# Patient Record
Sex: FEMALE | Race: WHITE | NOT HISPANIC OR LATINO | Employment: OTHER | URBAN - METROPOLITAN AREA
[De-identification: names, ages, dates, MRNs, and addresses within clinical notes are randomized per-mention and may not be internally consistent; named-entity substitution may affect disease eponyms.]

---

## 2024-07-29 DIAGNOSIS — Z00.00 ROUTINE HEALTH MAINTENANCE: ICD-10-CM

## 2024-07-29 DIAGNOSIS — Z76.89 ENCOUNTER TO ESTABLISH CARE WITH NEW DOCTOR: Primary | ICD-10-CM

## 2024-08-01 ENCOUNTER — OFFICE VISIT (OUTPATIENT)
Dept: CARDIOLOGY | Facility: CLINIC | Age: 74
End: 2024-08-01
Payer: MEDICARE

## 2024-08-01 ENCOUNTER — HOSPITAL ENCOUNTER (OUTPATIENT)
Dept: CARDIOLOGY | Facility: HOSPITAL | Age: 74
Discharge: HOME | End: 2024-08-01
Attending: INTERNAL MEDICINE
Payer: MEDICARE

## 2024-08-01 VITALS
BODY MASS INDEX: 32.62 KG/M2 | WEIGHT: 220.25 LBS | HEIGHT: 69 IN | DIASTOLIC BLOOD PRESSURE: 80 MMHG | SYSTOLIC BLOOD PRESSURE: 170 MMHG | OXYGEN SATURATION: 98 %

## 2024-08-01 DIAGNOSIS — M48.062 SPINAL STENOSIS OF LUMBAR REGION WITH NEUROGENIC CLAUDICATION: ICD-10-CM

## 2024-08-01 DIAGNOSIS — I25.10 CORONARY ARTERY CALCIFICATION: Primary | ICD-10-CM

## 2024-08-01 DIAGNOSIS — I65.23 BILATERAL CAROTID ARTERY STENOSIS: ICD-10-CM

## 2024-08-01 DIAGNOSIS — Z98.890 H/O CAROTID ENDARTERECTOMY: ICD-10-CM

## 2024-08-01 DIAGNOSIS — R94.31 ABNORMAL EKG: ICD-10-CM

## 2024-08-01 DIAGNOSIS — Z76.89 ENCOUNTER TO ESTABLISH CARE WITH NEW DOCTOR: ICD-10-CM

## 2024-08-01 DIAGNOSIS — Z72.0 TOBACCO USE: ICD-10-CM

## 2024-08-01 DIAGNOSIS — Z00.00 ROUTINE HEALTH MAINTENANCE: ICD-10-CM

## 2024-08-01 DIAGNOSIS — I10 PRIMARY HYPERTENSION: ICD-10-CM

## 2024-08-01 DIAGNOSIS — E78.49 OTHER HYPERLIPIDEMIA: ICD-10-CM

## 2024-08-01 DIAGNOSIS — I25.84 CORONARY ARTERY CALCIFICATION: Primary | ICD-10-CM

## 2024-08-01 DIAGNOSIS — I73.9 PERIPHERAL VASCULAR DISEASE OF LOWER EXTREMITY: ICD-10-CM

## 2024-08-01 DIAGNOSIS — I73.9 PVD (PERIPHERAL VASCULAR DISEASE): ICD-10-CM

## 2024-08-01 PROCEDURE — 99999 PR PBB SHADOW E&M-EST. PATIENT-LVL III: CPT | Mod: PBBFAC,,, | Performed by: INTERNAL MEDICINE

## 2024-08-01 PROCEDURE — 99213 OFFICE O/P EST LOW 20 MIN: CPT | Mod: PBBFAC | Performed by: INTERNAL MEDICINE

## 2024-08-01 RX ORDER — NAPROXEN SODIUM 220 MG/1
1 TABLET, FILM COATED ORAL EVERY MORNING
COMMUNITY

## 2024-08-01 RX ORDER — IRBESARTAN 75 MG/1
300 TABLET ORAL
COMMUNITY

## 2024-08-01 RX ORDER — ASCORBIC ACID 500 MG
500 TABLET ORAL
COMMUNITY

## 2024-08-01 RX ORDER — ALPRAZOLAM 0.5 MG/1
TABLET ORAL
COMMUNITY
Start: 2024-06-20

## 2024-08-01 RX ORDER — IBUPROFEN 200 MG
TABLET ORAL
COMMUNITY

## 2024-08-01 RX ORDER — IBUPROFEN 600 MG/1
600 TABLET ORAL 3 TIMES DAILY
COMMUNITY

## 2024-08-01 RX ORDER — AZITHROMYCIN 250 MG/1
TABLET, FILM COATED ORAL
COMMUNITY

## 2024-08-01 RX ORDER — BEMPEDOIC ACID AND EZETIMIBE 180; 10 MG/1; MG/1
1 TABLET, FILM COATED ORAL EVERY MORNING
Qty: 90 TABLET | Refills: 3 | Status: SHIPPED | OUTPATIENT
Start: 2024-08-01

## 2024-08-01 RX ORDER — FLUTICASONE PROPIONATE 50 MCG
1 SPRAY, SUSPENSION (ML) NASAL
COMMUNITY
Start: 2024-02-29 | End: 2024-08-27

## 2024-08-01 RX ORDER — BEMPEDOIC ACID AND EZETIMIBE 180; 10 MG/1; MG/1
1 TABLET, FILM COATED ORAL EVERY MORNING
COMMUNITY
Start: 2024-02-29 | End: 2024-08-01 | Stop reason: SDUPTHER

## 2024-08-01 RX ORDER — ACYCLOVIR 400 MG/1
400 TABLET ORAL
COMMUNITY
Start: 2024-07-31 | End: 2024-08-05

## 2024-08-01 RX ORDER — CYANOCOBALAMIN 1000 UG/ML
1000 INJECTION, SOLUTION INTRAMUSCULAR; SUBCUTANEOUS
COMMUNITY
Start: 2024-07-27

## 2024-08-01 RX ORDER — HYDROCHLOROTHIAZIDE 12.5 MG/1
12.5 TABLET ORAL DAILY
Qty: 30 TABLET | Refills: 11 | Status: SHIPPED | OUTPATIENT
Start: 2024-08-01 | End: 2025-08-01

## 2024-08-01 RX ORDER — CALCITRIOL 0.5 UG/1
0.5 CAPSULE ORAL
COMMUNITY

## 2024-08-01 RX ORDER — FLUTICASONE FUROATE, UMECLIDINIUM BROMIDE AND VILANTEROL TRIFENATATE 200; 62.5; 25 UG/1; UG/1; UG/1
1 POWDER RESPIRATORY (INHALATION)
COMMUNITY
Start: 2024-07-31

## 2024-08-01 RX ORDER — ESTRADIOL AND NORETHINDRONE ACETATE 1; .5 MG/1; MG/1
1 TABLET ORAL
COMMUNITY
Start: 2024-07-31

## 2024-08-01 NOTE — PROGRESS NOTES
Subjective:   Patient ID:  Jasmyne Rodriguez is a 73 y.o. female who presents for evaluation of Follow-up (ER follow up.)      HPI  New patient   74 yo female  had sudden onset 10 days of svere pain in upper back after walking in kitchen and having coffee.tghi ely cut her breathing she could not get a deep breath . She got a hot shower and has helped the pain . She is extremely weak.  Went to walk in clinic had cxr no significant pathology [place] ct [facility]  [facility] [place] that [place] . She subsequently left er  and went home and by the end of the day shane=t betetr and has other abnormalities. She has intolerance to statins was given repatha she had pain from repatha. She went to see dr FLYNN SHE WAS PLACED ON VASCEPA THAT SHE COULD NOT TOLERATE.   NOT TRIED ZETIA BEFORE. MED LIST SHOWED NEXLIZET THAT SHE IS NOT SURE WHO PRESCRIBED IT. SHE IS NOT SURE WHAT HAPPENED WITH PLAVIX.  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   Has exertional shortness of breath  has foot pain and iknee pain. She used to use the treadmill . Not taking diuretics.  History reviewed. No pertinent surgical history.    Social History     Tobacco Use    Smoking status: Some Days     Types: Cigarettes    Smokeless tobacco: Current       No family history on file.    Current Outpatient Medications   Medication Sig    acyclovir (ZOVIRAX) 400 MG tablet Take 400 mg by mouth.    ALPRAZolam (XANAX) 0.5 MG tablet TAKE 1 TABLET BY MOUTH 2 TIMES DAILY AS NEEDED FOR SLEEP.    ascorbic acid, vitamin C, (VITAMIN C) 500 MG tablet Take 500 mg by mouth.    aspirin 81 MG Chew Take 1 tablet by mouth every morning.    azithromycin (Z-ANTOINE) 250 MG tablet Oral for 5 Days    calcitRIOL (ROCALTROL) 0.5 MCG Cap Take 0.5 mcg by mouth.    cyanocobalamin 1,000 mcg/mL injection Inject 1,000 mcg into the muscle.    estradiol-norethindrone (ACTIVELLA) 1-0.5 mg per tablet Take 1 tablet by mouth.    fluticasone propionate (FLONASE) 50 mcg/actuation nasal spray 1  spray by Nasal route.    fluticasone-umeclidin-vilanter (TRELEGY ELLIPTA) 200-62.5-25 mcg inhaler Inhale 1 puff into the lungs.    ibuprofen (ADVIL) 200 MG tablet Advil    ibuprofen (ADVIL,MOTRIN) 600 MG tablet Take 600 mg by mouth 3 (three) times daily.    irbesartan (AVAPRO) 75 MG tablet 300 mg.    bempedoic acid-ezetimibe (NEXLIZET) 180-10 mg Tab Take 1 tablet by mouth every morning.    hydroCHLOROthiazide (HYDRODIURIL) 12.5 MG Tab Take 1 tablet (12.5 mg total) by mouth once daily.     No current facility-administered medications for this visit.     Current Outpatient Medications on File Prior to Visit   Medication Sig    acyclovir (ZOVIRAX) 400 MG tablet Take 400 mg by mouth.    ALPRAZolam (XANAX) 0.5 MG tablet TAKE 1 TABLET BY MOUTH 2 TIMES DAILY AS NEEDED FOR SLEEP.    ascorbic acid, vitamin C, (VITAMIN C) 500 MG tablet Take 500 mg by mouth.    aspirin 81 MG Chew Take 1 tablet by mouth every morning.    azithromycin (Z-ANTOINE) 250 MG tablet Oral for 5 Days    calcitRIOL (ROCALTROL) 0.5 MCG Cap Take 0.5 mcg by mouth.    cyanocobalamin 1,000 mcg/mL injection Inject 1,000 mcg into the muscle.    estradiol-norethindrone (ACTIVELLA) 1-0.5 mg per tablet Take 1 tablet by mouth.    fluticasone propionate (FLONASE) 50 mcg/actuation nasal spray 1 spray by Nasal route.    fluticasone-umeclidin-vilanter (TRELEGY ELLIPTA) 200-62.5-25 mcg inhaler Inhale 1 puff into the lungs.    ibuprofen (ADVIL) 200 MG tablet Advil    ibuprofen (ADVIL,MOTRIN) 600 MG tablet Take 600 mg by mouth 3 (three) times daily.    irbesartan (AVAPRO) 75 MG tablet 300 mg.    [DISCONTINUED] bempedoic acid-ezetimibe (NEXLIZET) 180-10 mg Tab Take 1 tablet by mouth every morning.     No current facility-administered medications on file prior to visit.       Review of patient's allergies indicates:   Allergen Reactions    Fentanyl Other (See Comments)     Patient states that she waken up during surgery, spikes blood pressure, and cold temp    Latex, natural  rubber Rash and Other (See Comments)    Penicillins Anaphylaxis and Other (See Comments)    Clopidogrel      myalgia    Evolocumab      Back pain    Gloves, latex with aloe vera Other (See Comments)    Statins-hmg-coa reductase inhibitors      myopathy    Clindamycin Hives, Other (See Comments) and Palpitations     Stripped intestinal track( blisters all over)       Review of Systems   Constitutional: Negative for diaphoresis, malaise/fatigue and weight gain.   HENT:  Negative for hoarse voice.    Eyes:  Negative for double vision and visual disturbance.   Cardiovascular:  Negative for chest pain, claudication, cyanosis, dyspnea on exertion, irregular heartbeat, leg swelling, near-syncope, orthopnea, palpitations, paroxysmal nocturnal dyspnea and syncope.   Respiratory:  Negative for cough, hemoptysis, shortness of breath and snoring.    Hematologic/Lymphatic: Negative for bleeding problem. Does not bruise/bleed easily.   Skin:  Negative for color change and poor wound healing.   Musculoskeletal:  Negative for muscle cramps, muscle weakness and myalgias.   Gastrointestinal:  Negative for bloating, abdominal pain, change in bowel habit, diarrhea, heartburn, hematemesis, hematochezia, melena and nausea.   Neurological:  Negative for excessive daytime sleepiness, dizziness, headaches, light-headedness, loss of balance, numbness and weakness.   Psychiatric/Behavioral:  Negative for memory loss. The patient does not have insomnia.    Allergic/Immunologic: Negative for hives.       Objective:   Physical Exam  Vitals and nursing note reviewed.   Constitutional:       General: She is not in acute distress.     Appearance: Normal appearance. She is well-developed. She is not ill-appearing.   HENT:      Head: Normocephalic and atraumatic.   Eyes:      General: No scleral icterus.     Pupils: Pupils are equal, round, and reactive to light.   Neck:      Thyroid: No thyromegaly.      Vascular: Normal carotid pulses. Carotid  "bruit present. No hepatojugular reflux or JVD.      Trachea: No tracheal deviation.      Comments: Scar cea well healed.   Cardiovascular:      Rate and Rhythm: Normal rate and regular rhythm.      Pulses: Normal pulses.      Heart sounds: Normal heart sounds. No murmur heard.     No friction rub. No gallop.   Pulmonary:      Effort: Pulmonary effort is normal. No respiratory distress.      Breath sounds: Normal breath sounds. No wheezing, rhonchi or rales.   Chest:      Chest wall: No tenderness.   Abdominal:      General: Bowel sounds are normal. There is no abdominal bruit.      Palpations: Abdomen is soft. There is no hepatomegaly or pulsatile mass.      Tenderness: There is no abdominal tenderness.   Musculoskeletal:      Right shoulder: No deformity.      Cervical back: Normal range of motion and neck supple.      Right lower leg: No edema.      Left lower leg: No edema.   Skin:     General: Skin is warm and dry.      Findings: No erythema or rash.      Nails: There is no clubbing.   Neurological:      General: No focal deficit present.      Mental Status: She is alert and oriented to person, place, and time.      Cranial Nerves: No cranial nerve deficit.      Coordination: Coordination normal.   Psychiatric:         Mood and Affect: Mood normal.         Speech: Speech normal.         Behavior: Behavior normal.       Vitals:    08/01/24 1425 08/01/24 1436   BP: (!) 160/80 (!) 170/80   BP Location: Left arm Left arm   Patient Position: Sitting Sitting   BP Method: Large (Manual) Large (Manual)   SpO2: 98%    Weight: 99.9 kg (220 lb 3.8 oz)    Height: 5' 9" (1.753 m)      No results found for: "CHOL"  Body mass index is 32.52 kg/m².   No results found for: "LABA1C", "HGBA1C"   BMP  No results found for: "NA", "K", "CL", "CO2", "BUN", "CREATININE", "CALCIUM", "ANIONGAP", "EGFRNORACEVR"   No results found for: "HDL"  No results found for: "LDLCALC"  No results found for: "TRIG"  No results found for: "CHOLHDL"    " "Chemistry    No results found for: "NA", "K", "CL", "CO2", "BUN", "CREATININE", "GLU" No results found for: "CALCIUM", "ALKPHOS", "AST", "ALT", "BILITOT", "ESTGFRAFRICA", "EGFRNONAA"       Lab Results   Component Value Date    TSH 1.47 08/17/2023     No results found for: "INR", "PROTIME"  No results found for: "WBC", "HGB", "HCT", "MCV", "PLT"  BNP  @LABRCNTIP(BNP,BNPTRIAGEBLO)@  CrCl cannot be calculated (No successful lab value found.).  Assessment:     1. Coronary artery calcification    2. Peripheral vascular disease of lower extremity    3. Bilateral carotid artery stenosis    4. Primary hypertension    5. Other hyperlipidemia    6. PVD (peripheral vascular disease)    7. Spinal stenosis of lumbar region with neurogenic claudication    8. Tobacco use    9. H/O carotid endarterectomy    10. Abnormal EKG    Htn not well controlled will add diuretics. Low salt diet emphasized.   Carotid stenosis s/p cea follows with vascular surgery Dr Salas CONTINUE ASA   HLP INTOLERANT TO STATINS REPATHA WILL TRY NEXLIZET  CORONARY CALCIFICATION WITH EXERTIONAL SHORTNESS OF BREATH AND ABNORMAL EKG WILL GET ECHO CARDIOLITE    PVD ASYMPTOMATIC S/P SFA INTERVENTION CONTINUE ASA EC 81 MG PO DAILY   SMOKING CESSATION COUNSELING PERFORMED.   SPINAL STENOSIS SUGGEST WATER EXERCISE SWIMMING FOR EXERCISE.     Plan:   ASA    NEXLIZET  ECHO    CARDIOLITE   BP CHART    F/U IN 1 MONTH.  SMOKING CESSATION    "

## 2024-08-06 ENCOUNTER — HOSPITAL ENCOUNTER (OUTPATIENT)
Dept: CARDIOLOGY | Facility: HOSPITAL | Age: 74
Discharge: HOME | End: 2024-08-06
Attending: INTERNAL MEDICINE
Payer: MEDICARE

## 2024-08-06 VITALS
SYSTOLIC BLOOD PRESSURE: 170 MMHG | WEIGHT: 220 LBS | DIASTOLIC BLOOD PRESSURE: 80 MMHG | HEIGHT: 69 IN | BODY MASS INDEX: 32.58 KG/M2

## 2024-08-06 DIAGNOSIS — Z72.0 TOBACCO USE: ICD-10-CM

## 2024-08-06 DIAGNOSIS — I25.10 CORONARY ARTERY CALCIFICATION: ICD-10-CM

## 2024-08-06 DIAGNOSIS — I10 PRIMARY HYPERTENSION: ICD-10-CM

## 2024-08-06 DIAGNOSIS — I25.84 CORONARY ARTERY CALCIFICATION: ICD-10-CM

## 2024-08-06 DIAGNOSIS — R94.31 ABNORMAL EKG: ICD-10-CM

## 2024-08-06 LAB
AORTIC ROOT ANNULUS: 3.4 CM
ASCENDING AORTA: 3.91 CM
AV INDEX (PROSTH): 0.89
AV MEAN GRADIENT: 4 MMHG
AV PEAK GRADIENT: 7 MMHG
AV VALVE AREA BY VELOCITY RATIO: 2.59 CM²
AV VALVE AREA: 2.65 CM²
AV VELOCITY RATIO: 0.87
BSA FOR ECHO PROCEDURE: 2.2 M2
CV ECHO LV RWT: 0.55 CM
DOP CALC AO PEAK VEL: 1.35 M/S
DOP CALC AO VTI: 32.9 CM
DOP CALC LVOT AREA: 3 CM2
DOP CALC LVOT DIAMETER: 1.95 CM
DOP CALC LVOT PEAK VEL: 1.17 M/S
DOP CALC LVOT STROKE VOLUME: 87.16 CM3
DOP CALC RVOT PEAK VEL: 1.17 M/S
DOP CALC RVOT VTI: 28.8 CM
DOP CALCLVOT PEAK VEL VTI: 29.2 CM
E WAVE DECELERATION TIME: 336.88 MSEC
E/A RATIO: 0.86
E/E' RATIO: 15.2 M/S
ECHO LV POSTERIOR WALL: 1.15 CM (ref 0.6–1.1)
EJECTION FRACTION: 60 %
FRACTIONAL SHORTENING: 35 % (ref 28–44)
INTERVENTRICULAR SEPTUM: 1.4 CM (ref 0.6–1.1)
IVC DIAMETER: 2 CM
IVRT: 131.3 MSEC
LA MAJOR: 4.8 CM
LA MINOR: 5.5 CM
LA WIDTH: 4.2 CM
LEFT ATRIUM AREA SYSTOLIC (APICAL 2 CHAMBER): 18.36 CM2
LEFT ATRIUM AREA SYSTOLIC (APICAL 4 CHAMBER): 14.66 CM2
LEFT ATRIUM SIZE: 3.08 CM
LEFT ATRIUM VOLUME INDEX MOD: 24.1 ML/M2
LEFT ATRIUM VOLUME INDEX: 26.2 ML/M2
LEFT ATRIUM VOLUME MOD: 51.8 CM3
LEFT ATRIUM VOLUME: 56.37 CM3
LEFT INTERNAL DIMENSION IN SYSTOLE: 2.74 CM (ref 2.1–4)
LEFT VENTRICLE DIASTOLIC VOLUME INDEX: 36.88 ML/M2
LEFT VENTRICLE DIASTOLIC VOLUME: 79.29 ML
LEFT VENTRICLE END SYSTOLIC VOLUME APICAL 2 CHAMBER: 60.87 ML
LEFT VENTRICLE END SYSTOLIC VOLUME APICAL 4 CHAMBER: 40.8 ML
LEFT VENTRICLE MASS INDEX: 91 G/M2
LEFT VENTRICLE SYSTOLIC VOLUME INDEX: 13 ML/M2
LEFT VENTRICLE SYSTOLIC VOLUME: 28.05 ML
LEFT VENTRICULAR INTERNAL DIMENSION IN DIASTOLE: 4.22 CM (ref 3.5–6)
LEFT VENTRICULAR MASS: 196.23 G
LV LATERAL E/E' RATIO: 12.67 M/S
LV SEPTAL E/E' RATIO: 19 M/S
LVED V (TEICH): 79.29 ML
LVES V (TEICH): 28.05 ML
LVOT MG: 2.73 MMHG
LVOT MV: 0.76 CM/S
MV PEAK A VEL: 1.33 M/S
MV PEAK E VEL: 1.14 M/S
MV STENOSIS PRESSURE HALF TIME: 97.7 MS
MV VALVE AREA P 1/2 METHOD: 2.25 CM2
OHS CV RV/LV RATIO: 0.85 CM
PV MEAN GRADIENT: 3 MMHG
PV MV: 0.83 M/S
PV PEAK GRADIENT: 5 MMHG
PV PEAK VELOCITY: 1.12 M/S
RA MAJOR: 4.47 CM
RA PRESSURE ESTIMATED: 3 MMHG
RA WIDTH: 4.14 CM
RIGHT VENTRICULAR END-DIASTOLIC DIMENSION: 3.59 CM
SINUS: 3.69 CM
STJ: 3.69 CM
TDI LATERAL: 0.09 M/S
TDI SEPTAL: 0.06 M/S
TDI: 0.08 M/S
TRICUSPID ANNULAR PLANE SYSTOLIC EXCURSION: 2.08 CM
Z-SCORE OF LEFT VENTRICULAR DIMENSION IN END DIASTOLE: -5.03
Z-SCORE OF LEFT VENTRICULAR DIMENSION IN END SYSTOLE: -3.47

## 2024-08-06 PROCEDURE — 93306 TTE W/DOPPLER COMPLETE: CPT | Mod: 26,,, | Performed by: INTERNAL MEDICINE

## 2024-08-06 PROCEDURE — 93306 TTE W/DOPPLER COMPLETE: CPT

## 2024-08-07 ENCOUNTER — TELEPHONE (OUTPATIENT)
Dept: CARDIOLOGY | Facility: CLINIC | Age: 74
End: 2024-08-07
Payer: MEDICARE

## 2024-08-20 ENCOUNTER — TELEPHONE (OUTPATIENT)
Dept: CARDIOLOGY | Facility: HOSPITAL | Age: 74
End: 2024-08-20
Payer: MEDICARE

## 2024-08-20 NOTE — TELEPHONE ENCOUNTER
Patient called and decided to cancel Nuclear Stress Test scheduled for 09/10/2024 at [facility], stating she will return to previous Cardiologist  who had ordered Nuc before seeing Dr. Blackmon, also with less waiting time between each segment of the test.

## 2024-08-27 ENCOUNTER — TELEPHONE (OUTPATIENT)
Dept: CARDIOLOGY | Facility: CLINIC | Age: 74
End: 2024-08-27
Payer: MEDICARE

## 2024-08-27 NOTE — TELEPHONE ENCOUNTER
Pt states she is getting Dr Goodrich's office to fax over stress test results that were abnormal. She would like you to call her once you receive them to have Dr Blackmon review to see if her appt needs moved up sooner.   She is understanding that she may need a heart cath based on these result and pertaining to her left ventricle.      Thank you.       ----- Message from Izabella Washington sent at 8/27/2024  3:26 PM CDT -----  Contact: Jasmyne  .Type:  Patient Returning Call    Who Called: Jasmyne   Who Left Message for Patient: Nicolasa  Does the patient know what this is regarding?: Testing questions/ concerns   Would the patient rather a call back or a response via [facility]?  Call   Best Call Back Number: [phone]  Additional Information:

## 2024-08-27 NOTE — TELEPHONE ENCOUNTER
Called [phone] and went to voice mail.  TCB to discuss testing.         ----- Message from Tiny Mendoza sent at 8/27/2024 10:40 AM CDT -----  Contact: self   .Type: Patient Call Back        Who called:   Patient      What is the request in detail:    Patient called in concerning abnormal stress test . Patient would like to move appt up to a sooner date because the test shown that it is a left ventricle appt   Can the clinic reply by [facility]?           Would the patient rather a call back or a response via [facility]?   call      Best call back number:  [phone]

## 2024-08-30 NOTE — TELEPHONE ENCOUNTER
Patient sent a form via my chart.  Test results from [facility].  Did you want her to return to [facility] or did you want her to return to Dr Kruger?

## 2024-09-05 ENCOUNTER — TELEPHONE (OUTPATIENT)
Dept: CARDIOLOGY | Facility: CLINIC | Age: 74
End: 2024-09-05
Payer: MEDICARE

## 2024-09-05 NOTE — TELEPHONE ENCOUNTER
Patient dropped off a letter for the provider to review.     I have scanned the copy into media.

## 2024-09-12 ENCOUNTER — TELEPHONE (OUTPATIENT)
Dept: CARDIOLOGY | Facility: CLINIC | Age: 74
End: 2024-09-12
Payer: MEDICARE

## 2024-09-12 NOTE — TELEPHONE ENCOUNTER
L ventricle issues been waiting 3 weeks- appt tomorrow      ----- Message from Tiny Mendoza sent at 9/12/2024  8:44 AM CDT -----  Contact: SELF   .Type: Patient Call Back        Who called:   pATIENT      What is the request in detail:    CALLED IN CONCERNING APPT THAT WAS CANCELLED DUE TO WEATHER. PATIENT WOULD LIKE TO BE SEEN TOMORROW IF POSSIBLE . PLEASE CALL BACK   Can the clinic reply by [facility]?           Would the patient rather a call back or a response via [facility]?   CALL      Best call back number:  [phone]

## 2024-09-13 ENCOUNTER — OFFICE VISIT (OUTPATIENT)
Dept: CARDIOLOGY | Facility: CLINIC | Age: 74
End: 2024-09-13
Payer: MEDICARE

## 2024-09-13 VITALS
DIASTOLIC BLOOD PRESSURE: 82 MMHG | OXYGEN SATURATION: 97 % | BODY MASS INDEX: 32.39 KG/M2 | HEART RATE: 80 BPM | WEIGHT: 218.69 LBS | HEIGHT: 69 IN | SYSTOLIC BLOOD PRESSURE: 160 MMHG

## 2024-09-13 DIAGNOSIS — I25.84 CORONARY ARTERY CALCIFICATION: ICD-10-CM

## 2024-09-13 DIAGNOSIS — Z72.0 TOBACCO USE: ICD-10-CM

## 2024-09-13 DIAGNOSIS — I73.9 PVD (PERIPHERAL VASCULAR DISEASE): ICD-10-CM

## 2024-09-13 DIAGNOSIS — I10 PRIMARY HYPERTENSION: ICD-10-CM

## 2024-09-13 DIAGNOSIS — E78.49 OTHER HYPERLIPIDEMIA: ICD-10-CM

## 2024-09-13 DIAGNOSIS — R94.39 ABNORMAL NUCLEAR STRESS TEST: Primary | ICD-10-CM

## 2024-09-13 DIAGNOSIS — M48.062 SPINAL STENOSIS OF LUMBAR REGION WITH NEUROGENIC CLAUDICATION: ICD-10-CM

## 2024-09-13 DIAGNOSIS — I25.10 CORONARY ARTERY CALCIFICATION: ICD-10-CM

## 2024-09-13 DIAGNOSIS — Z98.890 H/O CAROTID ENDARTERECTOMY: ICD-10-CM

## 2024-09-13 DIAGNOSIS — R94.31 ABNORMAL EKG: ICD-10-CM

## 2024-09-13 DIAGNOSIS — I65.23 BILATERAL CAROTID ARTERY STENOSIS: ICD-10-CM

## 2024-09-13 PROCEDURE — 99999 PR PBB SHADOW E&M-EST. PATIENT-LVL IV: CPT | Mod: PBBFAC,,, | Performed by: INTERNAL MEDICINE

## 2024-09-13 PROCEDURE — 99214 OFFICE O/P EST MOD 30 MIN: CPT | Mod: PBBFAC | Performed by: INTERNAL MEDICINE

## 2024-09-13 RX ORDER — AMLODIPINE BESYLATE 2.5 MG/1
2.5 TABLET ORAL DAILY
Qty: 90 TABLET | Refills: 3 | Status: SHIPPED | OUTPATIENT
Start: 2024-09-13 | End: 2025-09-13

## 2024-09-13 NOTE — PROGRESS NOTES
Subjective:   Patient ID:  Jasmyne Rodriguez is a 73 y.o. female who presents for follow up of No chief complaint on file.      HPI  New patient   72 yo female  had sudden onset 10 days of svere pain in upper back after walking in kitchen and having coffee.this pain cut her breathing she could not get a deep breath . She got a hot shower and has helped the pain . She is extremely weak.  Went to walk in clinic had cxr no significant pathology [place] ct [facility]  [facility] [place] . She subsequently left er  and went home and by the end of the day felt better and has other abnormalities. She has intolerance to statins was given repatha she had pain from repatha. She went to see dr FLYNN SHE WAS PLACED ON VASCEPA THAT SHE COULD NOT TOLERATE.   NOT TRIED ZETIA BEFORE. MED LIST SHOWED NEXLIZET THAT SHE IS NOT SURE WHO PRESCRIBED IT. SHE IS NOT SURE WHAT HAPPENED WITH PLAVIX.  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   Has exertional shortness of breath  has foot pain and iknee pain. She used to use the treadmill . Not taking diuretics.  History reviewed. No pertinent surgical history.    9/13/2024   Not sure that her dizziness she had was from nexlizet. She stopped it. She will try it again. Has no new symptoms. She is taking diuretics. She is under stress her friend passed away . I have reviewed her bp readings from home they are elevated. She is compliant with her diet by her report. Her shortness of breath is unchanged      Past Medical History:   Diagnosis Date    Bilateral carotid artery stenosis 08/01/2024    Other hyperlipidemia 08/01/2024    Primary hypertension 08/01/2024    Spinal stenosis of lumbar region with neurogenic claudication 08/01/2024       History reviewed. No pertinent surgical history.    Social History     Tobacco Use    Smoking status: Some Days     Types: Cigarettes    Smokeless tobacco: Current       No family history on file.    Current Outpatient Medications   Medication Sig     ALPRAZolam (XANAX) 0.5 MG tablet TAKE 1 TABLET BY MOUTH 2 TIMES DAILY AS NEEDED FOR SLEEP.    ascorbic acid, vitamin C, (VITAMIN C) 500 MG tablet Take 500 mg by mouth.    aspirin 81 MG Chew Take 1 tablet by mouth every morning.    bempedoic acid-ezetimibe (NEXLIZET) 180-10 mg Tab Take 1 tablet by mouth every morning. (Patient not taking: Reported on 9/13/2024)    calcitRIOL (ROCALTROL) 0.5 MCG Cap Take 0.5 mcg by mouth. (Patient not taking: Reported on 9/13/2024)    cyanocobalamin 1,000 mcg/mL injection Inject 1,000 mcg into the muscle. (Patient not taking: Reported on 9/13/2024)    estradiol-norethindrone (ACTIVELLA) 1-0.5 mg per tablet Take 1 tablet by mouth.    fluticasone-umeclidin-vilanter (TRELEGY ELLIPTA) 200-62.5-25 mcg inhaler Inhale 1 puff into the lungs. (Patient not taking: Reported on 9/13/2024)    hydroCHLOROthiazide (HYDRODIURIL) 12.5 MG Tab Take 1 tablet (12.5 mg total) by mouth once daily.    ibuprofen (ADVIL) 200 MG tablet Advil    ibuprofen (ADVIL,MOTRIN) 600 MG tablet Take 600 mg by mouth once daily.    irbesartan (AVAPRO) 75 MG tablet 300 mg.     No current facility-administered medications for this visit.     Current Outpatient Medications on File Prior to Visit   Medication Sig    ALPRAZolam (XANAX) 0.5 MG tablet TAKE 1 TABLET BY MOUTH 2 TIMES DAILY AS NEEDED FOR SLEEP.    ascorbic acid, vitamin C, (VITAMIN C) 500 MG tablet Take 500 mg by mouth.    aspirin 81 MG Chew Take 1 tablet by mouth every morning.    bempedoic acid-ezetimibe (NEXLIZET) 180-10 mg Tab Take 1 tablet by mouth every morning. (Patient not taking: Reported on 9/13/2024)    calcitRIOL (ROCALTROL) 0.5 MCG Cap Take 0.5 mcg by mouth. (Patient not taking: Reported on 9/13/2024)    cyanocobalamin 1,000 mcg/mL injection Inject 1,000 mcg into the muscle. (Patient not taking: Reported on 9/13/2024)    estradiol-norethindrone (ACTIVELLA) 1-0.5 mg per tablet Take 1 tablet by mouth.    fluticasone-umeclidin-vilanter (TRELEGY ELLIPTA)  200-62.5-25 mcg inhaler Inhale 1 puff into the lungs. (Patient not taking: Reported on 9/13/2024)    hydroCHLOROthiazide (HYDRODIURIL) 12.5 MG Tab Take 1 tablet (12.5 mg total) by mouth once daily.    ibuprofen (ADVIL) 200 MG tablet Advil    ibuprofen (ADVIL,MOTRIN) 600 MG tablet Take 600 mg by mouth once daily.    irbesartan (AVAPRO) 75 MG tablet 300 mg.    [DISCONTINUED] acyclovir (ZOVIRAX) 400 MG tablet Take 400 mg by mouth.    [DISCONTINUED] azithromycin (Z-ANTOINE) 250 MG tablet Oral for 5 Days     No current facility-administered medications on file prior to visit.     Review of patient's allergies indicates:   Allergen Reactions    Fentanyl Other (See Comments)     Patient states that she waken up during surgery, spikes blood pressure, and cold temp    Latex, natural rubber Rash and Other (See Comments)    Penicillins Anaphylaxis and Other (See Comments)    Clopidogrel      myalgia    Evolocumab      Back pain    Gloves, latex with aloe vera Other (See Comments)    Statins-hmg-coa reductase inhibitors      myopathy    Clindamycin Hives, Other (See Comments) and Palpitations     Stripped intestinal track( blisters all over)      Review of Systems   Constitutional: Negative for diaphoresis, malaise/fatigue and weight gain.   HENT:  Negative for hoarse voice.    Eyes:  Negative for double vision and visual disturbance.   Cardiovascular:  Positive for dyspnea on exertion. Negative for chest pain, claudication, cyanosis, irregular heartbeat, leg swelling, near-syncope, orthopnea, palpitations, paroxysmal nocturnal dyspnea and syncope.   Respiratory:  Positive for shortness of breath. Negative for cough, hemoptysis and snoring.    Hematologic/Lymphatic: Negative for bleeding problem. Does not bruise/bleed easily.   Skin:  Negative for color change and poor wound healing.   Musculoskeletal:  Negative for muscle cramps, muscle weakness and myalgias.   Gastrointestinal:  Negative for bloating, abdominal pain, change in  bowel habit, diarrhea, heartburn, hematemesis, hematochezia, melena and nausea.   Neurological:  Negative for excessive daytime sleepiness, dizziness, headaches, light-headedness, loss of balance, numbness and weakness.   Psychiatric/Behavioral:  Negative for memory loss. The patient does not have insomnia.    Allergic/Immunologic: Negative for hives.       Objective:   Physical Exam  Constitutional:       General: She is not in acute distress.     Appearance: Normal appearance. She is well-developed. She is not ill-appearing.   HENT:      Head: Normocephalic and atraumatic.   Eyes:      General: No scleral icterus.     Pupils: Pupils are equal, round, and reactive to light.   Neck:      Thyroid: No thyromegaly.      Vascular: Normal carotid pulses. Carotid bruit present. No hepatojugular reflux or JVD.      Trachea: No tracheal deviation.      Comments: Scar cea well healed.  Cardiovascular:      Rate and Rhythm: Normal rate and regular rhythm.      Pulses: Normal pulses.      Heart sounds: Normal heart sounds. No murmur heard.     No friction rub. No gallop.   Pulmonary:      Effort: Pulmonary effort is normal. No respiratory distress.      Breath sounds: Normal breath sounds. No wheezing, rhonchi or rales.   Chest:      Chest wall: No tenderness.   Abdominal:      General: Bowel sounds are normal. There is no abdominal bruit.      Palpations: Abdomen is soft. There is no hepatomegaly or pulsatile mass.      Tenderness: There is no abdominal tenderness.   Musculoskeletal:      Right shoulder: No deformity.      Cervical back: Normal range of motion and neck supple.   Skin:     General: Skin is warm and dry.      Findings: No erythema or rash.      Nails: There is no clubbing.   Neurological:      Mental Status: She is alert and oriented to person, place, and time.      Cranial Nerves: No cranial nerve deficit.      Coordination: Coordination normal.   Psychiatric:         Speech: Speech normal.         Behavior:  "Behavior normal.       Vitals:    09/13/24 0937 09/13/24 0941 09/13/24 0942   BP: (!) 160/82 (!) 154/77 (!) 160/82   BP Location: Right arm Right forearm Left arm   Patient Position: Sitting Sitting Sitting   BP Method: Large (Manual)  Large (Manual)   Pulse: 80     SpO2: 97%     Weight: 99.2 kg (218 lb 11.1 oz)     Height: 5' 9" (1.753 m)       No results found for: "CHOL"   Body mass index is 32.3 kg/m².   No results found for: "LABA1C", "HGBA1C"   BMP  No results found for: "NA", "K", "CL", "CO2", "BUN", "CREATININE", "CALCIUM", "ANIONGAP", "EGFRNORACEVR"   No results found for: "HDL"  No results found for: "LDLCALC"  No results found for: "TRIG"  No results found for: "CHOLHDL"    Chemistry    No results found for: "NA", "K", "CL", "CO2", "BUN", "CREATININE", "GLU" No results found for: "CALCIUM", "ALKPHOS", "AST", "ALT", "BILITOT", "ESTGFRAFRICA", "EGFRNONAA"       Lab Results   Component Value Date    TSH 1.47 08/17/2023     Summary  Show Result Comparison     Left Ventricle: The left ventricle is normal in size. Normal wall thickness. There is normal systolic function with a visually estimated ejection fraction of 60 - 65%. Ejection fraction by visual approximation is 60%. There is normal diastolic function.    Right Ventricle: Normal right ventricular cavity size. Wall thickness is normal. Systolic function is normal.    IVC/SVC: Normal venous pressure at 3 mmHg.     Assessment:     1. Abnormal nuclear stress test    2. PVD (peripheral vascular disease)    3. Bilateral carotid artery stenosis    4. Primary hypertension    5. Other hyperlipidemia    6. Spinal stenosis of lumbar region with neurogenic claudication    7. Coronary artery calcification    8. Abnormal EKG    9. Tobacco use    10. H/O carotid endarterectomy    [place] of cad ? Single vessel vs tip of iceberg.she has significant atherosclerosis with pvd carotid disease coronary " calcification smoking htn hlp . And has symptoms of shortness of breath she would benefit from coronary angiography in addition to smoking cessation taking her meds and place her on amlodpine 2.5 mg po daily.   Explained to her the rationale for heart cath risk benefits alternatives       Plan:   Amlodipine 2.5 mg po daily    Asa    Resume nexlizet.  Lhc/ptca  I have explained the risks, benefits , and alternatives of the procedure in detail.the patient voices understanding and all questions have been answered.the patient agrees to proceed as planned.

## 2024-09-16 ENCOUNTER — TELEPHONE (OUTPATIENT)
Dept: CARDIOLOGY | Facility: CLINIC | Age: 74
End: 2024-09-16
Payer: MEDICARE

## 2024-09-16 NOTE — TELEPHONE ENCOUNTER
Manuel for pt to call us back          ----- Message from Tiny Mendoza sent at 9/16/2024  1:43 PM CDT -----  Contact: self   .Type: Patient Call Back        Who called:   Patient      What is the request in detail:    Patient called in concerning being prescribed amLODIPine (NORVASC) 2.5 MG tablet before procedure . Patient states that she can't take this medication due to bloating and chest pain Patient wants to know if she needs to still take this medication to have the surgery . Please call back   Can the clinic reply by [facility]?           Would the patient rather a call back or a response via [facility]?   call      Best call back number:  [phone]

## 2024-09-17 ENCOUNTER — TELEPHONE (OUTPATIENT)
Dept: CARDIOLOGY | Facility: CLINIC | Age: 74
End: 2024-09-17
Payer: MEDICARE

## 2024-09-17 DIAGNOSIS — Z88.8 ALLERGY TO IODINE: Primary | ICD-10-CM

## 2024-09-17 RX ORDER — DIPHENHYDRAMINE HCL 50 MG
50 CAPSULE ORAL
COMMUNITY
End: 2024-09-17 | Stop reason: SDUPTHER

## 2024-09-17 RX ORDER — PREDNISONE 50 MG/1
TABLET ORAL
Qty: 3 TABLET | Refills: 0 | Status: SHIPPED | OUTPATIENT
Start: 2024-09-17

## 2024-09-17 RX ORDER — FAMOTIDINE 40 MG/1
40 TABLET, FILM COATED ORAL
COMMUNITY
End: 2024-09-17 | Stop reason: SDUPTHER

## 2024-09-17 RX ORDER — FAMOTIDINE 40 MG/1
TABLET, FILM COATED ORAL
Qty: 3 TABLET | Refills: 0 | Status: SHIPPED | OUTPATIENT
Start: 2024-09-17

## 2024-09-17 RX ORDER — DIPHENHYDRAMINE HCL 50 MG
CAPSULE ORAL
Qty: 3 CAPSULE | Refills: 0 | Status: SHIPPED | OUTPATIENT
Start: 2024-09-17

## 2024-09-17 RX ORDER — PREDNISONE 50 MG/1
50 TABLET ORAL
COMMUNITY
End: 2024-09-17 | Stop reason: SDUPTHER

## 2024-09-17 NOTE — TELEPHONE ENCOUNTER
----- Message from Colleen Leonard sent at 9/16/2024  5:43 PM CDT -----  Type:  Patient Returning Call     Who Called:LUIS ENRIQUE ROGERS [[id number]]  Who Left Message for Patient:kiki cr  Does the patient know what this is regarding?:surgery  Would the patient rather a call back or a response via [facility]? call  Best Call Back Number:[phone]   Additional Information: The patient is returning the call, the patient is requesting a voicemail if she does not answer.

## 2024-09-17 NOTE — TELEPHONE ENCOUNTER
Returned call to patient to discuss/review pre cath instructions  Stated she's allergic to Iodine products especially from stress test at [facility], advised after lengthy discussion she would need to take pre med prescriptions, voiced multiple complaints over choice of drugs and dosages, expressed her anxiety over planned procedure (heart cath), requesting 2 pillows one for under neck and one under her knees, complained about Amlodipine allergy and gastro upset even on the low dose ordered (advised stop taking), takes low dose of Xanax twice a day and advised try to avoid morning dose tomorrow, requested Propofol for her sedation advised would let MD know.  Patient addressed multiple concerns over 45 minutes, answered many questions and she's ready to proceed as scheduled

## 2024-09-17 NOTE — TELEPHONE ENCOUNTER
Called [phone] and spoke with patient about her medications.  I advised the below and she voiced understanding.

## 2024-09-18 ENCOUNTER — ANESTHESIA EVENT (OUTPATIENT)
Dept: CARDIOLOGY | Facility: HOSPITAL | Age: 74
End: 2024-09-18
Payer: MEDICARE

## 2024-09-18 ENCOUNTER — ANESTHESIA (OUTPATIENT)
Dept: CARDIOLOGY | Facility: HOSPITAL | Age: 74
End: 2024-09-18
Payer: MEDICARE

## 2024-09-18 ENCOUNTER — HOSPITAL ENCOUNTER (OUTPATIENT)
Facility: HOSPITAL | Age: 74
Discharge: HOME | End: 2024-09-18
Attending: INTERNAL MEDICINE | Admitting: INTERNAL MEDICINE
Payer: MEDICARE

## 2024-09-18 VITALS
TEMPERATURE: 98 F | BODY MASS INDEX: 32.39 KG/M2 | RESPIRATION RATE: 18 BRPM | DIASTOLIC BLOOD PRESSURE: 63 MMHG | HEIGHT: 69 IN | HEART RATE: 92 BPM | OXYGEN SATURATION: 96 % | WEIGHT: 218.69 LBS | SYSTOLIC BLOOD PRESSURE: 141 MMHG

## 2024-09-18 DIAGNOSIS — I25.10 CORONARY ARTERY CALCIFICATION: ICD-10-CM

## 2024-09-18 DIAGNOSIS — R06.09 DOE (DYSPNEA ON EXERTION): ICD-10-CM

## 2024-09-18 DIAGNOSIS — I20.0 ACCELERATING ANGINA: ICD-10-CM

## 2024-09-18 DIAGNOSIS — R94.31 ABNORMAL EKG: ICD-10-CM

## 2024-09-18 DIAGNOSIS — R94.39 ABNORMAL NUCLEAR STRESS TEST: ICD-10-CM

## 2024-09-18 DIAGNOSIS — Z98.890 H/O CAROTID ENDARTERECTOMY: ICD-10-CM

## 2024-09-18 DIAGNOSIS — I25.84 CORONARY ARTERY CALCIFICATION: ICD-10-CM

## 2024-09-18 LAB
ANION GAP SERPL CALC-SCNC: 11 MMOL/L (ref 8–16)
BASOPHILS # BLD AUTO: 0.01 K/UL (ref 0–0.2)
BASOPHILS NFR BLD: 0.1 % (ref 0–1.9)
BUN SERPL-MCNC: 24 MG/DL (ref 8–23)
CALCIUM SERPL-MCNC: 9.7 MG/DL (ref 8.7–10.5)
CATH EF QUANTITATIVE: 65 %
CHLORIDE SERPL-SCNC: 102 MMOL/L (ref 95–110)
CO2 SERPL-SCNC: 22 MMOL/L (ref 23–29)
CREAT SERPL-MCNC: 0.9 MG/DL (ref 0.5–1.4)
DIFFERENTIAL METHOD BLD: ABNORMAL
EOSINOPHIL # BLD AUTO: 0 K/UL (ref 0–0.5)
EOSINOPHIL NFR BLD: 0.1 % (ref 0–8)
ERYTHROCYTE [DISTWIDTH] IN BLOOD BY AUTOMATED COUNT: 12.4 % (ref 11.5–14.5)
EST. GFR  (NO RACE VARIABLE): >60 ML/MIN/1.73 M^2
GLUCOSE SERPL-MCNC: 145 MG/DL (ref 70–110)
HCT VFR BLD AUTO: 35.7 % (ref 37–48.5)
HGB BLD-MCNC: 12.5 G/DL (ref 12–16)
IMM GRANULOCYTES # BLD AUTO: 0.11 K/UL (ref 0–0.04)
IMM GRANULOCYTES NFR BLD AUTO: 0.9 % (ref 0–0.5)
INR PPP: 1 (ref 0.8–1.2)
LYMPHOCYTES # BLD AUTO: 0.9 K/UL (ref 1–4.8)
LYMPHOCYTES NFR BLD: 7.3 % (ref 18–48)
MCH RBC QN AUTO: 31.1 PG (ref 27–31)
MCHC RBC AUTO-ENTMCNC: 35 G/DL (ref 32–36)
MCV RBC AUTO: 89 FL (ref 82–98)
MONOCYTES # BLD AUTO: 0.3 K/UL (ref 0.3–1)
MONOCYTES NFR BLD: 2.2 % (ref 4–15)
NEUTROPHILS # BLD AUTO: 11.2 K/UL (ref 1.8–7.7)
NEUTROPHILS NFR BLD: 89.4 % (ref 38–73)
NRBC BLD-RTO: 0 /100 WBC
PLATELET # BLD AUTO: 232 K/UL (ref 150–450)
PMV BLD AUTO: 9.5 FL (ref 9.2–12.9)
POTASSIUM SERPL-SCNC: 4 MMOL/L (ref 3.5–5.1)
PROTHROMBIN TIME: 11.1 SEC (ref 9–12.5)
RBC # BLD AUTO: 4.02 M/UL (ref 4–5.4)
SODIUM SERPL-SCNC: 135 MMOL/L (ref 136–145)
WBC # BLD AUTO: 12.52 K/UL (ref 3.9–12.7)

## 2024-09-18 PROCEDURE — 85025 COMPLETE CBC W/AUTO DIFF WBC: CPT | Performed by: INTERNAL MEDICINE

## 2024-09-18 PROCEDURE — 25000003 PHARM REV CODE 250: Performed by: INTERNAL MEDICINE

## 2024-09-18 PROCEDURE — 85610 PROTHROMBIN TIME: CPT | Performed by: INTERNAL MEDICINE

## 2024-09-18 PROCEDURE — 25500020 PHARM REV CODE 255: Performed by: INTERNAL MEDICINE

## 2024-09-18 PROCEDURE — 37000009 HC ANESTHESIA EA ADD 15 MINS: Performed by: INTERNAL MEDICINE

## 2024-09-18 PROCEDURE — C1894 INTRO/SHEATH, NON-LASER: HCPCS | Performed by: INTERNAL MEDICINE

## 2024-09-18 PROCEDURE — C1769 GUIDE WIRE: HCPCS | Performed by: INTERNAL MEDICINE

## 2024-09-18 PROCEDURE — 93458 L HRT ARTERY/VENTRICLE ANGIO: CPT | Performed by: INTERNAL MEDICINE

## 2024-09-18 PROCEDURE — 37000008 HC ANESTHESIA 1ST 15 MINUTES: Performed by: INTERNAL MEDICINE

## 2024-09-18 PROCEDURE — 63600175 PHARM REV CODE 636 W HCPCS: Performed by: FAMILY MEDICINE

## 2024-09-18 PROCEDURE — 63600175 PHARM REV CODE 636 W HCPCS: Performed by: INTERNAL MEDICINE

## 2024-09-18 PROCEDURE — 25000003 PHARM REV CODE 250: Performed by: FAMILY MEDICINE

## 2024-09-18 PROCEDURE — 93458 L HRT ARTERY/VENTRICLE ANGIO: CPT | Mod: 26,,, | Performed by: INTERNAL MEDICINE

## 2024-09-18 PROCEDURE — 80048 BASIC METABOLIC PNL TOTAL CA: CPT | Performed by: INTERNAL MEDICINE

## 2024-09-18 RX ORDER — LIDOCAINE HYDROCHLORIDE 20 MG/ML
INJECTION, SOLUTION EPIDURAL; INFILTRATION; INTRACAUDAL; PERINEURAL
Status: DISCONTINUED | OUTPATIENT
Start: 2024-09-18 | End: 2024-09-18

## 2024-09-18 RX ORDER — HEPARIN SODIUM 1000 [USP'U]/ML
INJECTION, SOLUTION INTRAVENOUS; SUBCUTANEOUS
Status: DISCONTINUED | OUTPATIENT
Start: 2024-09-18 | End: 2024-09-18

## 2024-09-18 RX ORDER — NAPROXEN SODIUM 220 MG/1
81 TABLET, FILM COATED ORAL ONCE
Status: COMPLETED | OUTPATIENT
Start: 2024-09-18 | End: 2024-09-18

## 2024-09-18 RX ORDER — ACETAMINOPHEN 325 MG/1
650 TABLET ORAL EVERY 4 HOURS PRN
Status: DISCONTINUED | OUTPATIENT
Start: 2024-09-18 | End: 2024-09-18 | Stop reason: HOSPADM

## 2024-09-18 RX ORDER — PROPOFOL 10 MG/ML
VIAL (ML) INTRAVENOUS CONTINUOUS PRN
Status: DISCONTINUED | OUTPATIENT
Start: 2024-09-18 | End: 2024-09-18

## 2024-09-18 RX ORDER — SODIUM CHLORIDE 9 MG/ML
INJECTION, SOLUTION INTRAVENOUS CONTINUOUS
Status: ACTIVE | OUTPATIENT
Start: 2024-09-18 | End: 2024-09-18

## 2024-09-18 RX ORDER — ONDANSETRON 8 MG/1
8 TABLET, ORALLY DISINTEGRATING ORAL EVERY 8 HOURS PRN
Status: DISCONTINUED | OUTPATIENT
Start: 2024-09-18 | End: 2024-09-18 | Stop reason: HOSPADM

## 2024-09-18 RX ORDER — SODIUM CHLORIDE 0.9 % (FLUSH) 0.9 %
10 SYRINGE (ML) INJECTION
Status: DISCONTINUED | OUTPATIENT
Start: 2024-09-18 | End: 2024-09-18 | Stop reason: HOSPADM

## 2024-09-18 RX ORDER — NITROGLYCERIN 5 MG/ML
INJECTION, SOLUTION INTRAVENOUS
Status: DISCONTINUED | OUTPATIENT
Start: 2024-09-18 | End: 2024-09-18 | Stop reason: HOSPADM

## 2024-09-18 RX ORDER — DIPHENHYDRAMINE HCL 50 MG
50 CAPSULE ORAL ONCE
Status: COMPLETED | OUTPATIENT
Start: 2024-09-18 | End: 2024-09-18

## 2024-09-18 RX ORDER — HYDRALAZINE HYDROCHLORIDE 20 MG/ML
10 INJECTION INTRAMUSCULAR; INTRAVENOUS EVERY 6 HOURS PRN
Status: DISCONTINUED | OUTPATIENT
Start: 2024-09-18 | End: 2024-09-18 | Stop reason: HOSPADM

## 2024-09-18 RX ORDER — MIDAZOLAM HYDROCHLORIDE 1 MG/ML
INJECTION INTRAMUSCULAR; INTRAVENOUS
Status: DISCONTINUED | OUTPATIENT
Start: 2024-09-18 | End: 2024-09-18

## 2024-09-18 RX ORDER — KETAMINE HCL IN 0.9 % NACL 50 MG/5 ML
SYRINGE (ML) INTRAVENOUS
Status: DISCONTINUED | OUTPATIENT
Start: 2024-09-18 | End: 2024-09-18

## 2024-09-18 RX ORDER — LIDOCAINE HYDROCHLORIDE 20 MG/ML
INJECTION, SOLUTION EPIDURAL; INFILTRATION; INTRACAUDAL; PERINEURAL
Status: DISCONTINUED | OUTPATIENT
Start: 2024-09-18 | End: 2024-09-18 | Stop reason: HOSPADM

## 2024-09-18 RX ORDER — VERAPAMIL HYDROCHLORIDE 2.5 MG/ML
INJECTION, SOLUTION INTRAVENOUS
Status: DISCONTINUED | OUTPATIENT
Start: 2024-09-18 | End: 2024-09-18 | Stop reason: HOSPADM

## 2024-09-18 RX ORDER — PROPOFOL 10 MG/ML
VIAL (ML) INTRAVENOUS
Status: DISCONTINUED | OUTPATIENT
Start: 2024-09-18 | End: 2024-09-18

## 2024-09-18 RX ADMIN — HEPARIN SODIUM 5000 UNITS: 1000 INJECTION, SOLUTION INTRAVENOUS; SUBCUTANEOUS at 01:09

## 2024-09-18 RX ADMIN — PROPOFOL 50 MCG/KG/MIN: 10 INJECTION, EMULSION INTRAVENOUS at 01:09

## 2024-09-18 RX ADMIN — DIPHENHYDRAMINE HYDROCHLORIDE 50 MG: 50 CAPSULE ORAL at 11:09

## 2024-09-18 RX ADMIN — SODIUM CHLORIDE, SODIUM LACTATE, POTASSIUM CHLORIDE, AND CALCIUM CHLORIDE: .6; .31; .03; .02 INJECTION, SOLUTION INTRAVENOUS at 01:09

## 2024-09-18 RX ADMIN — ASPIRIN 81 MG CHEWABLE TABLET 81 MG: 81 TABLET CHEWABLE at 11:09

## 2024-09-18 RX ADMIN — Medication 10 MG: at 01:09

## 2024-09-18 RX ADMIN — SODIUM CHLORIDE: 9 INJECTION, SOLUTION INTRAVENOUS at 02:09

## 2024-09-18 RX ADMIN — PROPOFOL 50 MG: 10 INJECTION, EMULSION INTRAVENOUS at 01:09

## 2024-09-18 RX ADMIN — SODIUM CHLORIDE: 9 INJECTION, SOLUTION INTRAVENOUS at 12:09

## 2024-09-18 RX ADMIN — HYDRALAZINE HYDROCHLORIDE 10 MG: 20 INJECTION, SOLUTION INTRAMUSCULAR; INTRAVENOUS at 03:09

## 2024-09-18 RX ADMIN — MIDAZOLAM HYDROCHLORIDE 2 MG: 1 INJECTION, SOLUTION INTRAMUSCULAR; INTRAVENOUS at 01:09

## 2024-09-18 RX ADMIN — LIDOCAINE HYDROCHLORIDE 50 MG: 20 INJECTION, SOLUTION EPIDURAL; INFILTRATION; INTRACAUDAL; PERINEURAL at 01:09

## 2024-09-18 NOTE — NURSING
Peeing on self  Complete linen change w/skin care performed.  Patient continually talking telling story repetitively.  Pure Wick placed.  Friend at bedside attempting to get pt to lie quietly and rest.

## 2024-09-18 NOTE — DISCHARGE INSTRUCTIONS
"Post-op Heart Catheterization    1. DIET: It is advisable for you to follow a diet that limits the intake of salt, sugar, saturated fats and cholesterol.     2. DRIVING: Due to sedation you received during your procedure, DO NOT drive or operate machinery for 24 hours. Avoid making critical decisions or signing legal documents until tomorrow.    3. ACTIVITY: AVOID activities that require bending of the affected arm/wrist for 3 days and submerging the site in water for 3 days.   REMOVE the dressing the day after the procedure, and shower.    Wash with soap and water in hand; do not use wash cloth.  Apply a bandaid to site after shower.    No ointments, lotions, powders, perfumes, ... for 5 days.  WEAR wrist immobilizer until bedtime Thursday night.  No pushing, pulling, or lifting more than 10 pounds for 3 days  No riding motorcycles, riding lawn mowers, using push mowers or weed eaters... for 5 days.  You may RESUME your normal activities or prescribed exercise program as instructed by your physician after 5 days.                                                                                                       4. WOUND CARE: It is not unusual to have a small amount of bruising to appear at or near the puncture site. It is also common to have a tender "knot" develop beneath the skin at the puncture site of the wrist/arm. This is usually scar tissue and is not a cause for concern or alarm. This tender knot may take several weeks to fully resolve. The bruise will usually spread over several days. However, if the lump gets bigger, call your doctor immediately.    5. DISCOMFORT: For general discomfort at the puncture site, you may take 1 or 2 Acetaminophen (Tylenol) tablets every 4 - 6 hours as needed. (Do not take more than 4000 mg a day)    6. SIGNS AND SYMPTOMS TO REPORT:  Call your physician immediately if any of the following occur:                                            1. Loss of feeling, warmth or color " "to the affected arm/wrist                                                                                                            2. Mild bleeding from the site                 3. Pain that is sudden, sharp or persistent in the affected arm/wrist                 4. Swelling or a change in "lump" size, increased redness or drainage at the puncture site                                                                               5. High fever (101 degrees or higher)    7. GO TO  THE EMERGENCY ROOM OR CALL 911 IF YOU HAVE: Chest pains or discomforts not relieved with 3 nitroglycerin doses (sublingual tablets or spray), numbness or severe pain of limb, if your limb becomes cold or discolored or if you develop uncontrolled bleeding from the puncture site (quickly apply firm, direct pressure above the site).  "

## 2024-09-18 NOTE — NURSING
Rec'd to CVRU  Coughing frequently with  thick white secretions Eyes closed  Will open upon request on occasion.  Restless,, continually moving about in bed.  Talking incomprehensible words.  Not following requests at present  NS at 125/hr  NSR per monitor.  L TR Band in place w/splint  Site benign

## 2024-09-18 NOTE — ANESTHESIA POSTPROCEDURE EVALUATION
Anesthesia Post Evaluation    Patient: Jasmyne Rodriguez    Procedure(s) Performed: Procedure(s) (LRB):  Left heart cath (Left)    Final Anesthesia Type: MAC      Patient location during evaluation: Cath Lab  Patient participation: Yes- Able to Participate  Level of consciousness: awake and alert  Post-procedure vital signs: reviewed and stable  Pain management: adequate  Airway patency: patent    PONV status at discharge: No PONV  Anesthetic complications: no      Cardiovascular status: stable  Respiratory status: unassisted and spontaneous ventilation  Hydration status: euvolemic  Follow-up not needed.              Vitals Value Taken Time   BP  09/18/24 1409   Temp  09/18/24 1409   Pulse  09/18/24 1409   Resp  09/18/24 1409   SpO2  09/18/24 1409         No case tracking events are documented in the log.      Pain/Diamond Score: Diamond Score: 10 (9/18/2024  2:04 PM)

## 2024-09-18 NOTE — TRANSFER OF CARE
"Anesthesia Transfer of Care Note    Patient: Jasmyne Rodriguez    Procedure(s) Performed: Procedure(s) (LRB):  Left heart cath (Left)    Patient location: Cath Lab    Anesthesia Type: MAC    Transport from OR: Transported from OR on room air with adequate spontaneous ventilation    Post pain: adequate analgesia    Post assessment: no apparent anesthetic complications    Post vital signs: stable    Level of consciousness: awake and responds to stimulation    Nausea/Vomiting: no nausea/vomiting    Complications: none    Transfer of care protocol was followed      Last vitals: Visit Vitals  BP (!) 176/76 (BP Location: Left arm, Patient Position: Lying)   Pulse 97   Temp 36.7 °C (98.1 °F) (Temporal)   Resp 18   Ht 5' 9" (1.753 m)   Wt 99.2 kg (218 lb 11.1 oz)   SpO2 98%   Breastfeeding No   BMI 32.30 kg/m²     "

## 2024-09-18 NOTE — INTERVAL H&P NOTE
The patient has been examined and the H&P has been reviewed:    I concur with the findings and no changes have occurred since H&P was written.    Procedure risks, benefits and alternative options discussed and understood by patient/family.          Active Hospital Problems    Diagnosis  POA    *Abnormal nuclear stress test [R94.39]  Yes    Abnormal EKG [R94.31]  Yes    Coronary artery calcification [I25.10, I25.84]  Yes    H/O carotid endarterectomy [Z98.890]  Not Applicable    Other hyperlipidemia [E78.49]  Yes    Primary hypertension [I10]  Yes    PVD (peripheral vascular disease) [I73.9]  Yes    Tobacco use [Z72.0]  Yes      Resolved Hospital Problems   No resolved problems to display.

## 2024-09-18 NOTE — OP NOTE
INPATIENT Operative Note         SUMMARY     Surgery Date: 9/18/2024     Surgeons and Role:     * Tameka Blackmon MD - Primary    ASSISTANT:none    Pre-op Diagnosis:  Abnormal nuclear stress test [R94.39]  Abnormal EKG [R94.31]  Coronary artery calcification [I25.10, I25.84]  HUTCHISON (dyspnea on exertion) [R06.09]  Accelerating angina [I20.0]  H/O carotid endarterectomy [Z98.890]      Post-op Diagnosis:  Abnormal nuclear stress test [R94.39]  Abnormal EKG [R94.31]  Coronary artery calcification [I25.10, I25.84]  HUTCHISON (dyspnea on exertion) [R06.09]  Accelerating angina [I20.0]  H/O carotid endarterectomy [Z98.890]    Procedure(s) (LRB):  Left heart cath (Left)    COMPLICATION:none    Anesthesia: Monitor Anesthesia Care    Findings/Key Components:  Luminal irregularities of lad and lcx.  Rca normal  Lvf normal.     Estimated Blood Loss: < 50 ML.         SPECIMEN: NONE    Devices/Prostetics: None    PLAN: reassure   Rf modification.

## 2024-09-18 NOTE — PLAN OF CARE
right hand saline lock discontinued with canula intact; tolerated well.  Left radial dressing remains c/d/i and wnl at time of discharge.  Pt able to eat and drink while in CVRU without issue; remains AAOx3 at time of discharge.   Pt ambulated to and from bathroom without issue; site remained c/d/i after ambulation.   Discharge instructions, home medication list, and post discharge follow up appointments discussed with pt and friend.  Discharged to friend, via wheelchair, in no apparent distress. All personal belongings taken with pt.   Encouraged continued compliance with MD directives.

## 2024-09-18 NOTE — ANESTHESIA PREPROCEDURE EVALUATION
09/18/2024  Jasmyne Rodriguez is a 73 y.o., female.      Pre-op Assessment    I have reviewed the Patient Summary Reports.     I have reviewed the Nursing Notes. I have reviewed the NPO Status.   I have reviewed the Medications.     Review of Systems  Anesthesia Hx:  No problems with previous Anesthesia                Social:  Smoker       Hematology/Oncology:  Hematology Normal   Oncology Normal                                   EENT/Dental:  EENT/Dental Normal           Cardiovascular:     Hypertension   CAD                                        Pulmonary:  Pulmonary Normal                       Renal/:  Renal/ Normal                 Hepatic/GI:  Hepatic/GI Normal                 Musculoskeletal:  Musculoskeletal Normal                Neurological:  Neurology Normal                                      Endocrine:  Endocrine Normal            Dermatological:  Skin Normal    Psych:  Psychiatric Normal                    Physical Exam  General: Well nourished, Cooperative, Alert and Oriented    Airway:  Mallampati: II   Mouth Opening: Normal  TM Distance: Normal  Tongue: Normal  Neck ROM: Normal ROM    Dental:  Intact    Chest/Lungs:  Clear to auscultation    Heart:  Rhythm: Regular Rhythm  Sounds: Normal    Abdomen:  Normal        Anesthesia Plan  Type of Anesthesia, risks & benefits discussed:    Anesthesia Type: MAC  Intra-op Monitoring Plan: Standard ASA Monitors  Induction:  IV  Informed Consent: Informed consent signed with the Patient and all parties understand the risks and agree with anesthesia plan.  All questions answered.   ASA Score: 3  Day of Surgery Review of History & Physical: I have interviewed and examined the patient. I have reviewed the patient's H&P dated:     Ready For Surgery From Anesthesia Perspective.     .

## 2024-09-18 NOTE — Clinical Note
The catheter was inserted over the wire into the ostium   left main. An angiography was performed of the left coronary arteries. The result was suboptimal..

## 2024-09-19 ENCOUNTER — TELEPHONE (OUTPATIENT)
Dept: CARDIOLOGY | Facility: HOSPITAL | Age: 74
End: 2024-09-19
Payer: MEDICARE

## 2024-09-19 NOTE — TELEPHONE ENCOUNTER
Pt said she was scheuled with you for her follow up not a NP - she could not make the appt with you due to a  she is planning so she has to reschedule- no avaialbility with you until - scheduled her with Renée on  at the [facility]   Pt wants to make sure it is OK with you for her to follow up with the NP  Please advise            ----- Message from Jenny Myles sent at 2024  2:44 PM CDT -----  Contact: Jasmyne  .Patient is calling to speak with the nurse regarding appt . Reports needing to reschedule appt due to the patient having a service to attend. Pt states needing something before the  or something after oct 3. Please give patient a call back at [phone]

## 2024-09-20 ENCOUNTER — TELEPHONE (OUTPATIENT)
Dept: CARDIOLOGY | Facility: HOSPITAL | Age: 74
End: 2024-09-20
Payer: MEDICARE

## 2024-09-20 NOTE — TELEPHONE ENCOUNTER
Tameka Blackmon MD  You11 hours ago (8:44 PM)       Yes it is ok   The patient has been notified of this information and all questions answered.

## 2024-09-20 NOTE — DISCHARGE SUMMARY
[facility] - Cath Lab (Hospital)  Discharge Note  Short Stay    Procedure(s) (LRB):  Left heart cath (Left)      OUTCOME: Patient tolerated treatment/procedure well without complication and is now ready for discharge.    DISPOSITION: Home or Self Care    FINAL DIAGNOSIS:  Abnormal nuclear stress test    FOLLOWUP: In clinic    DISCHARGE INSTRUCTIONS:    Discharge Procedure Orders   Diet general     Call MD for:  temperature >100.4     Call MD for:  persistent nausea and vomiting     Call MD for:  severe uncontrolled pain     Call MD for:  difficulty breathing, headache or visual disturbances     Call MD for:  redness, tenderness, or signs of infection (pain, swelling, redness, odor or green/yellow discharge around incision site)     Call MD for:  hives     Call MD for:  persistent dizziness or light-headedness     Call MD for:  extreme fatigue        TIME SPENT ON DISCHARGE: 25 minutes

## 2024-09-30 ENCOUNTER — OFFICE VISIT (OUTPATIENT)
Dept: CARDIOLOGY | Facility: CLINIC | Age: 74
End: 2024-09-30
Payer: MEDICARE

## 2024-09-30 VITALS — HEART RATE: 82 BPM | OXYGEN SATURATION: 97 % | BODY MASS INDEX: 32.36 KG/M2 | HEIGHT: 69 IN | WEIGHT: 218.5 LBS

## 2024-09-30 DIAGNOSIS — Z72.0 TOBACCO USE: ICD-10-CM

## 2024-09-30 DIAGNOSIS — I65.23 BILATERAL CAROTID ARTERY STENOSIS: ICD-10-CM

## 2024-09-30 DIAGNOSIS — I73.9 PVD (PERIPHERAL VASCULAR DISEASE): Primary | ICD-10-CM

## 2024-09-30 DIAGNOSIS — I10 PRIMARY HYPERTENSION: ICD-10-CM

## 2024-09-30 DIAGNOSIS — E78.49 OTHER HYPERLIPIDEMIA: ICD-10-CM

## 2024-09-30 DIAGNOSIS — Z98.890 H/O CAROTID ENDARTERECTOMY: ICD-10-CM

## 2024-09-30 PROCEDURE — 99214 OFFICE O/P EST MOD 30 MIN: CPT | Mod: PBBFAC

## 2024-09-30 PROCEDURE — 99999 PR PBB SHADOW E&M-EST. PATIENT-LVL IV: CPT | Mod: PBBFAC,,,

## 2024-09-30 PROCEDURE — 99214 OFFICE O/P EST MOD 30 MIN: CPT | Mod: S$PBB,,,

## 2024-09-30 NOTE — PROGRESS NOTES
Subjective:   Patient ID:  Jasmyne Rodriguez is a 73 y.o. female who presents for evaluation of Follow-up (Left heart cath )      HPI 72y/o F whose current medical conditions include, carotid stenosis, Hlp, HTN, back pain, statin intolerant followed by Dr. Blackmon in cardiology clinic here today s/p [facility] non obs disease, EF nml. [facility] site C/D/I. Compliant with medications, BP mildly elevated in clinic    Past Medical History:   Diagnosis Date    Bilateral carotid artery stenosis 08/01/2024    Other hyperlipidemia 08/01/2024    Primary hypertension 08/01/2024    Spinal stenosis of lumbar region with neurogenic claudication 08/01/2024       Past Surgical History:   Procedure Laterality Date    LEFT HEART CATHETERIZATION Left 9/18/2024    Procedure: Left heart cath;  Surgeon: Tameka Blackmon MD;  Location: [facility] CATH LAB;  Service: Cardiology;  Laterality: Left;  Patient requested Propofol sedation       Social History     Tobacco Use    Smoking status: Some Days     Types: Cigarettes    Smokeless tobacco: Current       No family history on file.    Current Outpatient Medications on File Prior to Visit   Medication Sig Dispense Refill    ALPRAZolam (XANAX) 0.5 MG tablet TAKE 1 TABLET BY MOUTH 2 TIMES DAILY AS NEEDED FOR SLEEP.      ascorbic acid, vitamin C, (VITAMIN C) 500 MG tablet Take 500 mg by mouth.      aspirin 81 MG Chew Take 1 tablet by mouth every morning.      calcitRIOL (ROCALTROL) 0.5 MCG Cap Take 0.5 mcg by mouth.      cyanocobalamin 1,000 mcg/mL injection Inject 1,000 mcg into the muscle.      diphenhydrAMINE (BENADRYL) 50 MG capsule Take one tablet along with Pepcid 40 mg and Prednisone 50 mg the day before procedure beginning at 6pm-12mn and 6am 3 capsule 0    estradiol-norethindrone (ACTIVELLA) 1-0.5 mg per tablet Take 1 tablet by mouth.      famotidine (PEPCID) 40 MG tablet Take one tablet along with Benadryl 50 mg and Prednisone 50 mg the day before procedure beginning at 6pm-12mn and 6am 3 tablet 0     fluticasone-umeclidin-vilanter (TRELEGY ELLIPTA) 200-62.5-25 mcg inhaler Inhale 1 puff into the lungs.      hydroCHLOROthiazide (HYDRODIURIL) 12.5 MG Tab Take 1 tablet (12.5 mg total) by mouth once daily. 30 tablet 11    ibuprofen (ADVIL) 200 MG tablet Advil      ibuprofen (ADVIL,MOTRIN) 600 MG tablet Take 600 mg by mouth once daily.      irbesartan (AVAPRO) 75 MG tablet 300 mg.      predniSONE (DELTASONE) 50 MG Tab Take one tablet along with Benadryl 50 mg and Pepcid 40 mg the day before procedure beginning at 6pm-12mn and 6am 3 tablet 0    bempedoic acid-ezetimibe (NEXLIZET) 180-10 mg Tab Take 1 tablet by mouth every morning. 90 tablet 3     No current facility-administered medications on file prior to visit.      Wt Readings from Last 3 Encounters:   09/30/24 99.1 kg (218 lb 7.6 oz)   09/18/24 99.2 kg (218 lb 11.1 oz)   09/13/24 99.2 kg (218 lb 11.1 oz)     Temp Readings from Last 3 Encounters:   09/18/24 98 °F (36.7 °C) (Temporal)     BP Readings from Last 3 Encounters:   09/18/24 (!) 141/63   09/13/24 (!) 160/82   08/06/24 (!) 170/80     Pulse Readings from Last 3 Encounters:   09/30/24 82   09/18/24 92   09/13/24 80        Review of Systems   Constitutional: Negative.   HENT: Negative.     Eyes: Negative.    Cardiovascular: Negative.    Respiratory: Negative.     Skin: Negative.    Musculoskeletal: Negative.    Gastrointestinal: Negative.    Genitourinary: Negative.    Neurological: Negative.    Psychiatric/Behavioral: Negative.         Objective:   Physical Exam  Vitals and nursing note reviewed.   Constitutional:       Appearance: Normal appearance.   HENT:      Head: Normocephalic.   Eyes:      Pupils: Pupils are equal, round, and reactive to light.   Cardiovascular:      Rate and Rhythm: Normal rate and regular rhythm.      Heart sounds: Normal heart sounds, S1 normal and S2 normal. No murmur heard.     No S3 or S4 sounds.   Pulmonary:      Effort: Pulmonary effort is normal.      Breath sounds: Normal  "breath sounds.   Abdominal:      General: Bowel sounds are normal.      Palpations: Abdomen is soft.   Musculoskeletal:         General: Normal range of motion.      Cervical back: Normal range of motion.   Skin:     Capillary Refill: Capillary refill takes less than 2 seconds.   Neurological:      General: No focal deficit present.      Mental Status: She is alert and oriented to person, place, and time.   Psychiatric:         Mood and Affect: Mood normal.         Behavior: Behavior normal.         Thought Content: Thought content normal.         No results found for: "CHOL"  No results found for: "HDL"  No results found for: "LDLCALC"  No results found for: "TRIG"  No results found for: "CHOLHDL"    Chemistry        Component Value Date/Time     (L) 09/18/2024 1132    K 4.0 09/18/2024 1132     09/18/2024 1132    CO2 22 (L) 09/18/2024 1132    BUN 24 (H) 09/18/2024 1132    CREATININE 0.9 09/18/2024 1132     (H) 09/18/2024 1132        Component Value Date/Time    CALCIUM 9.7 09/18/2024 1132          Lab Results   Component Value Date    TSH 1.47 08/17/2023     Lab Results   Component Value Date    INR 1.0 09/18/2024     @RESUFAST(WBC,HGB,HCT,MCV,PLT)  @LABRCNTIP(BNP,BNPTRIAGEBLO)@  CrCl cannot be calculated (Patient's most recent lab result is older than the maximum 7 days allowed.).     Results for orders placed during the hospital encounter of 08/06/24    Echo    Interpretation Summary    Left Ventricle: The left ventricle is normal in size. Normal wall thickness. There is normal systolic function with a visually estimated ejection fraction of 60 - 65%. Ejection fraction by visual approximation is 60%. There is normal diastolic function.    Right Ventricle: Normal right ventricular cavity size. Wall thickness is normal. Systolic function is normal.    IVC/SVC: Normal venous pressure at 3 mmHg.     No results found for this or any previous visit.     Assessment:      1. PVD (peripheral vascular " disease)    2. Bilateral carotid artery stenosis    3. Primary hypertension    4. Other hyperlipidemia    5. H/O carotid endarterectomy    6. Tobacco use        Plan:   PVD (peripheral vascular disease)    Bilateral carotid artery stenosis    Primary hypertension    Other hyperlipidemia    H/O carotid endarterectomy    Tobacco use      Smoking cessation  Asa, nexlizet- Hlp, carotid stenosis  Irbesartan, HCTZ, asa, profile BP, low Na diet- HTN  RF modifications  Low na low fat diet  Daily exercise as tolerated  RTC 6 mos    Renée Penn, LYN-C [facility], Cardiology

## 2025-04-01 ENCOUNTER — TELEPHONE (OUTPATIENT)
Dept: CARDIOLOGY | Facility: CLINIC | Age: 75
End: 2025-04-01
Payer: MEDICARE

## 2025-04-01 NOTE — TELEPHONE ENCOUNTER
Attempted to reach patient to advise upcoming [facility] No answer. Left VM.    ----- Message from Fatou sent at 4/1/2025  9:57 AM CDT -----  .Type:   Appointment RequestName of Caller:ChristenJasmyne Rodriguez When is the first available appointment?Symptoms:Best Call Back Number:.[phone] Information: Patient have a disability and can't do appointments before 10:00 and she would like an appointment at [facility]. EL

## 2025-05-26 DIAGNOSIS — I10 PRIMARY HYPERTENSION: ICD-10-CM

## 2025-05-26 DIAGNOSIS — I73.9 PVD (PERIPHERAL VASCULAR DISEASE): Primary | ICD-10-CM

## 2025-06-03 ENCOUNTER — HOSPITAL ENCOUNTER (OUTPATIENT)
Dept: CARDIOLOGY | Facility: HOSPITAL | Age: 75
Discharge: HOME | End: 2025-06-03
Attending: INTERNAL MEDICINE
Payer: MEDICARE

## 2025-06-03 ENCOUNTER — PATIENT MESSAGE (OUTPATIENT)
Dept: CARDIOLOGY | Facility: CLINIC | Age: 75
End: 2025-06-03
Payer: MEDICARE

## 2025-06-03 DIAGNOSIS — I73.9 PVD (PERIPHERAL VASCULAR DISEASE): Primary | ICD-10-CM

## 2025-06-03 DIAGNOSIS — I10 PRIMARY HYPERTENSION: ICD-10-CM

## 2025-06-03 DIAGNOSIS — I73.9 PVD (PERIPHERAL VASCULAR DISEASE): ICD-10-CM

## 2025-06-04 ENCOUNTER — OFFICE VISIT (OUTPATIENT)
Dept: CARDIOLOGY | Facility: CLINIC | Age: 75
End: 2025-06-04
Payer: MEDICARE

## 2025-06-04 ENCOUNTER — HOSPITAL ENCOUNTER (OUTPATIENT)
Dept: CARDIOLOGY | Facility: HOSPITAL | Age: 75
Discharge: HOME | End: 2025-06-04
Attending: INTERNAL MEDICINE
Payer: MEDICARE

## 2025-06-04 VITALS
SYSTOLIC BLOOD PRESSURE: 145 MMHG | WEIGHT: 215.06 LBS | OXYGEN SATURATION: 97 % | DIASTOLIC BLOOD PRESSURE: 79 MMHG | BODY MASS INDEX: 31.76 KG/M2 | HEART RATE: 86 BPM

## 2025-06-04 DIAGNOSIS — I10 PRIMARY HYPERTENSION: ICD-10-CM

## 2025-06-04 DIAGNOSIS — M25.532 LEFT WRIST PAIN: ICD-10-CM

## 2025-06-04 DIAGNOSIS — Z98.890 H/O CAROTID ENDARTERECTOMY: Primary | ICD-10-CM

## 2025-06-04 DIAGNOSIS — R94.31 ABNORMAL EKG: ICD-10-CM

## 2025-06-04 DIAGNOSIS — I73.9 PVD (PERIPHERAL VASCULAR DISEASE): ICD-10-CM

## 2025-06-04 DIAGNOSIS — Z72.0 TOBACCO USE: ICD-10-CM

## 2025-06-04 DIAGNOSIS — I25.10 CORONARY ARTERY CALCIFICATION: ICD-10-CM

## 2025-06-04 DIAGNOSIS — E78.49 OTHER HYPERLIPIDEMIA: ICD-10-CM

## 2025-06-04 PROCEDURE — 99214 OFFICE O/P EST MOD 30 MIN: CPT | Mod: PBBFAC,25 | Performed by: INTERNAL MEDICINE

## 2025-06-04 PROCEDURE — 93005 ELECTROCARDIOGRAM TRACING: CPT

## 2025-06-04 PROCEDURE — 99999 PR PBB SHADOW E&M-EST. PATIENT-LVL IV: CPT | Mod: PBBFAC,,, | Performed by: INTERNAL MEDICINE

## 2025-06-04 RX ORDER — METOPROLOL SUCCINATE 25 MG/1
25 TABLET, EXTENDED RELEASE ORAL DAILY
Qty: 30 TABLET | Refills: 11 | Status: SHIPPED | OUTPATIENT
Start: 2025-06-04 | End: 2026-06-04

## 2025-06-05 DIAGNOSIS — M79.642 LEFT HAND PAIN: Primary | ICD-10-CM

## 2025-06-05 LAB
OHS QRS DURATION: 78 MS
OHS QTC CALCULATION: 441 MS

## 2025-06-09 ENCOUNTER — OFFICE VISIT (OUTPATIENT)
Dept: ORTHOPEDICS | Facility: CLINIC | Age: 75
End: 2025-06-09
Payer: MEDICARE

## 2025-06-09 ENCOUNTER — HOSPITAL ENCOUNTER (OUTPATIENT)
Dept: RADIOLOGY | Facility: HOSPITAL | Age: 75
Discharge: HOME | End: 2025-06-09
Attending: ORTHOPAEDIC SURGERY
Payer: MEDICARE

## 2025-06-09 VITALS — BODY MASS INDEX: 31.87 KG/M2 | WEIGHT: 215.19 LBS | HEIGHT: 69 IN

## 2025-06-09 DIAGNOSIS — M79.642 LEFT HAND PAIN: ICD-10-CM

## 2025-06-09 DIAGNOSIS — M18.12 ARTHRITIS OF CARPOMETACARPAL (CMC) JOINT OF LEFT THUMB: Primary | ICD-10-CM

## 2025-06-09 DIAGNOSIS — M25.532 LEFT WRIST PAIN: ICD-10-CM

## 2025-06-09 PROCEDURE — 99999 PR PBB SHADOW E&M-EST. PATIENT-LVL III: CPT | Mod: PBBFAC,,, | Performed by: ORTHOPAEDIC SURGERY

## 2025-06-09 PROCEDURE — 73130 X-RAY EXAM OF HAND: CPT | Mod: TC,LT

## 2025-06-09 PROCEDURE — 20600 DRAIN/INJ JOINT/BURSA W/O US: CPT | Mod: PBBFAC,LT | Performed by: ORTHOPAEDIC SURGERY

## 2025-06-09 PROCEDURE — 73130 X-RAY EXAM OF HAND: CPT | Mod: 26,LT,, | Performed by: RADIOLOGY

## 2025-06-09 PROCEDURE — 99203 OFFICE O/P NEW LOW 30 MIN: CPT | Mod: S$PBB,25,, | Performed by: ORTHOPAEDIC SURGERY

## 2025-06-09 PROCEDURE — 99999PBSHW PR PBB SHADOW TECHNICAL ONLY FILED TO HB: Mod: PBBFAC,,,

## 2025-06-09 PROCEDURE — 99213 OFFICE O/P EST LOW 20 MIN: CPT | Mod: PBBFAC,25 | Performed by: ORTHOPAEDIC SURGERY

## 2025-06-09 RX ORDER — TRIAMCINOLONE ACETONIDE 40 MG/ML
40 INJECTION, SUSPENSION INTRA-ARTICULAR; INTRAMUSCULAR
Status: DISCONTINUED | OUTPATIENT
Start: 2025-06-09 | End: 2025-06-09 | Stop reason: HOSPADM

## 2025-06-09 RX ADMIN — TRIAMCINOLONE ACETONIDE 40 MG: 200 INJECTION, SUSPENSION INTRA-ARTICULAR; INTRAMUSCULAR at 01:06

## 2025-06-09 NOTE — PROGRESS NOTES
Subjective:     Patient ID: Jasmyne Rodriguez is a 74 y.o. female.    Chief Complaint: No chief complaint on file.      HPI:  The patient is a 74-year-old female with left thumb basal joint arthritis.  She wishes to try injection today and will be given a thumb spica splint    Past Medical History:   Diagnosis Date    Bilateral carotid artery stenosis 08/01/2024    Other hyperlipidemia 08/01/2024    Primary hypertension 08/01/2024    Spinal stenosis of lumbar region with neurogenic claudication 08/01/2024     Past Surgical History:   Procedure Laterality Date    LEFT HEART CATHETERIZATION Left 9/18/2024    Procedure: Left heart cath;  Surgeon: Tameka Blackmon MD;  Location: [facility] CATH LAB;  Service: Cardiology;  Laterality: Left;  Patient requested Propofol sedation     No family history on file.  Social History[1]  Medication List with Changes/Refills   Current Medications    ALPRAZOLAM (XANAX) 0.5 MG TABLET    TAKE 1 TABLET BY MOUTH 2 TIMES DAILY AS NEEDED FOR SLEEP.    ASCORBIC ACID, VITAMIN C, (VITAMIN C) 500 MG TABLET    Take 500 mg by mouth.    ASPIRIN 81 MG CHEW    Take 1 tablet by mouth every morning.    BEMPEDOIC ACID-EZETIMIBE (NEXLIZET) 180-10 MG TAB    Take 1 tablet by mouth every morning.    CALCITRIOL (ROCALTROL) 0.5 MCG CAP    Take 0.5 mcg by mouth.    CYANOCOBALAMIN 1,000 MCG/ML INJECTION    Inject 1,000 mcg into the muscle.    DIPHENHYDRAMINE (BENADRYL) 50 MG CAPSULE    Take one tablet along with Pepcid 40 mg and Prednisone 50 mg the day before procedure beginning at 6pm-12mn and 6am    ESTRADIOL-NORETHINDRONE (ACTIVELLA) 1-0.5 MG PER TABLET    Take 1 tablet by mouth.    FAMOTIDINE (PEPCID) 40 MG TABLET    Take one tablet along with Benadryl 50 mg and Prednisone 50 mg the day before procedure beginning at 6pm-12mn and 6am    FLUTICASONE-UMECLIDIN-VILANTER (TRELEGY ELLIPTA) 200-62.5-25 MCG INHALER    Inhale 1 puff into the lungs.    IBUPROFEN (ADVIL) 200 MG TABLET    Advil    IBUPROFEN (ADVIL,MOTRIN) 600  MG TABLET    Take 600 mg by mouth once daily.    IRBESARTAN (AVAPRO) 75 MG TABLET    300 mg.    METOPROLOL SUCCINATE (TOPROL-XL) 25 MG 24 HR TABLET    Take 1 tablet (25 mg total) by mouth once daily.    PREDNISONE (DELTASONE) 50 MG TAB    Take one tablet along with Benadryl 50 mg and Pepcid 40 mg the day before procedure beginning at 6pm-12mn and 6am     Review of patient's allergies indicates:   Allergen Reactions    Fentanyl Other (See Comments)     Patient states that she waken up during surgery, spikes blood pressure, and cold temp    Latex, natural rubber Rash and Other (See Comments)    Norvasc [amlodipine] Swelling and Other (See Comments)     Chest pain, bloating    Penicillins Anaphylaxis and Other (See Comments)    Clopidogrel      myalgia    Evolocumab      Back pain    Gloves, latex with aloe vera Other (See Comments)    Statins-hmg-coa reductase inhibitors      myopathy    Clindamycin Hives, Other (See Comments) and Palpitations     Stripped intestinal track( blisters all over)    Iodine Nausea And Vomiting and Other (See Comments)     Review of Systems   Constitutional: Negative for malaise/fatigue.   HENT:  Negative for hearing loss.    Eyes:  Negative for double vision and visual disturbance.   Cardiovascular:  Negative for chest pain.   Respiratory:  Negative for shortness of breath.    Endocrine: Negative for cold intolerance.   Hematologic/Lymphatic: Does not bruise/bleed easily.   Skin:  Negative for poor wound healing and suspicious lesions.   Musculoskeletal:  Positive for arthritis, back pain, joint pain and joint swelling. Negative for gout.   Gastrointestinal:  Negative for nausea and vomiting.   Genitourinary:  Negative for dysuria.   Neurological:  Negative for numbness, paresthesias and sensory change.   Psychiatric/Behavioral:  Positive for substance abuse. Negative for depression and memory loss. The patient is not nervous/anxious.    Allergic/Immunologic: Negative for persistent  infections.       Objective:   There is no height or weight on file to calculate BMI.  There were no vitals filed for this visit.             General    Constitutional: She is oriented to person, place, and time. She appears well-developed and well-nourished. No distress.   HENT:   Head: Normocephalic.   Eyes: EOM are normal.   Pulmonary/Chest: Effort normal.   Neurological: She is oriented to person, place, and time.   Psychiatric: She has a normal mood and affect.         Left Hand/Wrist Exam     Inspection   Scars: Wrist - absent Hand -  absent  Effusion: Wrist - absent Hand -  absent    Pain   Wrist - The patient exhibits pain of the CMC.    Other     Sensory Exam  Median Distribution: normal  Ulnar Distribution: normal  Radial Distribution: normal    Comments:  The patient has tenderness basal joint left thumb with a positive axial circumduction grind test          Vascular Exam       Capillary Refill  Left Hand: normal capillary refill          Relevant imaging results reviewed and interpreted by me, discussed with the patient and / or family today radiographs left hand showed basal joint arthritis as well as arthritic change in multiple small joints  Assessment:     Encounter Diagnoses   Name Primary?    Left wrist pain     Arthritis of carpometacarpal (CMC) joint of left thumb Yes        Plan:     The patient is injected left thumb basal joint with 0.5 cc Kenalog and 0.5 cc 2% plain lidocaine under sterile technique.  She will wait at least 3 months between injections.  She was given a left thumb spica splint                Disclaimer: This note was prepared using a voice recognition system and is likely to have sound alike errors within the text.          [1]   Social History  Socioeconomic History    Marital status: Single   Tobacco Use    Smoking status: Some Days     Types: Cigarettes    Smokeless tobacco: Current     Social Drivers of Health     Financial Resource Strain: Low Risk  (9/23/2024)     Overall Financial Resource Strain (CARDIA)     Difficulty of Paying Living Expenses: Not hard at all   Food Insecurity: No Food Insecurity (9/23/2024)    Hunger Vital Sign     Worried About Running Out of Food in the Last Year: Never true     Ran Out of Food in the Last Year: Never true   Physical Activity: Insufficiently Active (9/23/2024)    Exercise Vital Sign     Days of Exercise per Week: 3 days     Minutes of Exercise per Session: 20 min   Stress: Stress Concern Present (9/23/2024)    [employer] [place] [employer] - Occupational Stress Questionnaire     Feeling of Stress : To some extent   Housing Stability: Unknown (9/23/2024)    Housing Stability Vital Sign     Unable to Pay for Housing in the Last Year: No

## 2025-06-09 NOTE — PROCEDURES
Small Joint Aspiration/Injection: L thumb CMC    Date/Time: 6/9/2025 1:45 PM    Performed by: Wm Guillen MD  Authorized by: Wm Guillen MD    Consent Done?:  Yes (Verbal)  Indications:  Pain and arthritis  Site marked: the procedure site was marked    Timeout: prior to procedure the correct patient, procedure, and site was verified    Prep: patient was prepped and draped in usual sterile fashion      Local anesthesia used?: Yes    Local anesthetic:  Lidocaine 2% without epinephrine  Anesthetic total (ml):  0.5    Location:  Thumb  Site:  L thumb CMC  Ultrasonic guidance for needle placement?: No    Needle size:  25 G  Approach:  Dorsal  Medications:  40 mg triamcinolone acetonide 40 mg/mL

## (undated) DEVICE — PACK HEART CATH BR

## (undated) DEVICE — CATH JR4 5FR

## (undated) DEVICE — CATH PIG145 INFINITI 5X110CM

## (undated) DEVICE — CATH INFINITI MULTIPAK JR4 5FR

## (undated) DEVICE — CATH JL4 5FR

## (undated) DEVICE — KIT SYR REUSABLE

## (undated) DEVICE — ANGIOTOUCH KIT

## (undated) DEVICE — DRAPE ANGIO BRACH 38X44IN

## (undated) DEVICE — KIT MANIFOLD LOW PRESS TUBING

## (undated) DEVICE — BAND TR COMP DEVICE REG 24CM

## (undated) DEVICE — CATH JL5 5FR

## (undated) DEVICE — OMNIPAQUE 300MG 150ML VIAL

## (undated) DEVICE — KIT GLIDESHEATH SLEND 6FR 10CM

## (undated) DEVICE — GUIDEWIRE 3CM FLEX .035IN 150